# Patient Record
Sex: FEMALE | Race: BLACK OR AFRICAN AMERICAN | NOT HISPANIC OR LATINO | Employment: OTHER | URBAN - METROPOLITAN AREA
[De-identification: names, ages, dates, MRNs, and addresses within clinical notes are randomized per-mention and may not be internally consistent; named-entity substitution may affect disease eponyms.]

---

## 2023-04-21 ENCOUNTER — OFFICE VISIT (OUTPATIENT)
Dept: PODIATRY | Facility: CLINIC | Age: 48
End: 2023-04-21
Payer: MEDICARE

## 2023-04-21 VITALS — WEIGHT: 175 LBS | HEIGHT: 66 IN | BODY MASS INDEX: 28.12 KG/M2

## 2023-04-21 DIAGNOSIS — F79 INTELLECTUAL DISABILITY: ICD-10-CM

## 2023-04-21 DIAGNOSIS — B35.3 TINEA PEDIS OF BOTH FEET: ICD-10-CM

## 2023-04-21 DIAGNOSIS — E13.9 DIABETES 1.5, MANAGED AS TYPE 2: Primary | ICD-10-CM

## 2023-04-21 DIAGNOSIS — R62.50 DEVELOPMENTAL DELAY: ICD-10-CM

## 2023-04-21 PROCEDURE — 99999 PR PBB SHADOW E&M-NEW PATIENT-LVL III: ICD-10-PCS | Mod: PBBFAC,,, | Performed by: PODIATRIST

## 2023-04-21 PROCEDURE — 99203 PR OFFICE/OUTPT VISIT, NEW, LEVL III, 30-44 MIN: ICD-10-PCS | Mod: S$PBB,,, | Performed by: PODIATRIST

## 2023-04-21 PROCEDURE — 99999 PR PBB SHADOW E&M-NEW PATIENT-LVL III: CPT | Mod: PBBFAC,,, | Performed by: PODIATRIST

## 2023-04-21 PROCEDURE — 99203 OFFICE O/P NEW LOW 30 MIN: CPT | Mod: PBBFAC | Performed by: PODIATRIST

## 2023-04-21 PROCEDURE — 99203 OFFICE O/P NEW LOW 30 MIN: CPT | Mod: S$PBB,,, | Performed by: PODIATRIST

## 2023-04-21 RX ORDER — CICLOPIROX 80 MG/ML
SOLUTION TOPICAL NIGHTLY
Qty: 6.6 ML | Refills: 1 | Status: SHIPPED | OUTPATIENT
Start: 2023-04-21

## 2023-04-21 NOTE — PROGRESS NOTES
Subjective:     Patient ID: Asia Morelos is a 47 y.o. female.    Chief Complaint: Ingrown Toenail (No c/o pain, Diabetic Pt,wears tennis shoes with socks,  last seen on 08/16/22 with Chris Thornton NP)    Asia is a 47 y.o. female who presents to the clinic upon referral from Dr. Jones  for evaluation and treatment of diabetic feet. Asia has a past medical history of Amenorrhea, Dementia, Developmental disorder, Diabetes mellitus, Hyperlipidemia, IRSA (idiopathic refractory sideroblastic anemia), and MR (mental retardation). Patient relates no major problem with feet. Only complaints today consist of toes. Patient is developmentally delayed and accompanied by her aide today.     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 Apply to bilateral 4th webspace   Current Medications    BRIMONIDINE 0.15 % OPTH DROP (ALPHAGAN) 0.15 % OPHTHALMIC SOLUTION    1 drop 3 (three) times daily.    DONEPEZIL (ARICEPT) 10 MG TABLET    Take 10 mg by mouth every evening.    MEMANTINE (NAMENDA) 10 MG TAB    Take 5 mg by mouth 2 (two) times daily.    METFORMIN (GLUCOPHAGE) 500 MG TABLET    Take 500 mg by mouth 2 (two) times daily with meals.    NAPROXEN (NAPROSYN) 500 MG TABLET    Take 1 tablet (500 mg total) by mouth 2 (two) times daily with meals.    NORETHINDRONE-ETHINYL ESTRADIOL-IRON (ESTROSTEP FE) 1-20(5)/1-30(7) /1MG-35MCG (9) TAB    Take by mouth once daily.    POLYETHYLENE GLYCOL (GLYCOLAX) 17 GRAM PWPK    Take by mouth.    RISPERIDONE (RISPERDAL) 0.5 MG TAB    Take by  "mouth.    SIMVASTATIN (ZOCOR) 40 MG TABLET    Take 40 mg by mouth every evening.    TIMOLOL 0.25 % OPHTHALMIC SOLUTION    1-2 drops 2 (two) times daily.    TRAZODONE (DESYREL) 50 MG TABLET    Take 50 mg by mouth every evening.    VALSARTAN (DIOVAN) 160 MG TABLET    Take 160 mg by mouth once daily.       Review of patient's allergies indicates:  No Known Allergies    History reviewed. No pertinent surgical history.    History reviewed. No pertinent family history.    Social History     Socioeconomic History    Marital status: Single   Tobacco Use    Smoking status: Never   Substance and Sexual Activity    Alcohol use: No    Drug use: No    Sexual activity: Never       Vitals:    04/21/23 0950   Weight: 79.4 kg (175 lb)   Height: 5' 6" (1.676 m)       Hemoglobin A1C   Date Value Ref Range Status   07/01/2020 4.8 <=6.5 % Final       Review of Systems   Unable to perform ROS: Mental acuity           Objective:      PHYSICAL EXAM: Apperance: Alert and orient in no distress,well developed, and with good attention to grooming and body habits  Patient presents ambulating in tennis shoes.   LOWER EXTREMITY EXAM:  VASCULAR: Dorsalis pedis pulses 2/4 bilateral and Posterior Tibial pulses 2/4 bilateral. Capillary fill time <4 seconds bilateral. No edema observed bilateral. Varicosities absent bilateral. Skin temperature of the lower extremities is warm to warm, proximal to distal. Hair growth WNL bilateral.  DERMATOLOGICAL: No skin rashes, subcutaneous nodules, lesions, or ulcers observed bilateral. Nails 1,2,3,4,5 bilateral normal length and thickness. Nails bilateral minimally incurvated at the distal medial and lateral nail edges.. Webspaces 1,2,3 bilateral clean, dry and without evidence of break in skin integrity. Bilateral 4th webspaces macerated.   NEUROLOGICAL: Light touch, sharp-dull, proprioception all present and equal bilaterally.    MUSCULOSKELETAL: Muscle strength is 5/5 for foot inverters, everters, " plantarflexors, and dorsiflexors. Muscle tone is normal. No pain on palpation of bilateral feet/nails.           Assessment:       ICD-10-CM ICD-9-CM   1. Diabetes 1.5, managed as type 2  E13.9 250.00   2. Tinea pedis of both feet  B35.3 110.4   3. Mental retardation  F79 319   4. Developmental delay  R62.50 783.40       Plan:   Diabetes 1.5, managed as type 2    Tinea pedis of both feet  -     ciclopirox (PENLAC) 8 % Soln; Apply topically nightly. Apply to bilateral 4th webspace  Dispense: 6.6 mL; Refill: 1    Mental retardation    Developmental delay      I counseled the patient on her conditions, regarding findings of my examination, my impressions, and usual treatment plan.   This visit spent on counseling and coordination of care.  Appointment spent on education about the diabetic foot, neuropathy, and prevention of limb loss.  Shoe inspection. Diabetic Foot Education. Patient reminded of the importance of good nutrition and blood sugar control to help prevent podiatric complications of diabetes. Patient instructed on proper foot hygeine. We discussed wearing proper shoe gear, daily foot inspections, never walking without protective shoe gear, never putting sharp instruments to feet.    Discuss treatment options for nail fungus.  I explained that fungus lives in a warm dark moist environment and therefore patient should make every attempt to keep feet clean and dry.  We discussed drying feet thoroughly after shower particularly between the toes and then applying powder between the toes and in the shoes.    Prescription written for Ciclopriox solution to be applied to bilateral 4th webspaces.    Patient  will continue to monitor the areas daily, inspect feet, wear protective shoe gear when ambulatory, moisturizer to maintain skin integrity. Patient reminded of the importance of good nutrition and blood sugar control to help prevent podiatric complications of diabetes.  Patient to return 2 months or sooner if  needed.          AUBRIE GriderM  [facility]

## 2024-08-23 ENCOUNTER — OFFICE VISIT (OUTPATIENT)
Dept: PODIATRY | Facility: CLINIC | Age: 49
End: 2024-08-23
Payer: MEDICARE

## 2024-08-23 VITALS — HEIGHT: 66 IN | WEIGHT: 175.06 LBS | BODY MASS INDEX: 28.14 KG/M2

## 2024-08-23 DIAGNOSIS — F79 INTELLECTUAL DISABILITY: ICD-10-CM

## 2024-08-23 DIAGNOSIS — E13.9 DIABETES 1.5, MANAGED AS TYPE 2: Primary | ICD-10-CM

## 2024-08-23 DIAGNOSIS — B35.3 TINEA PEDIS OF BOTH FEET: ICD-10-CM

## 2024-08-23 DIAGNOSIS — R62.50 DEVELOPMENTAL DELAY: ICD-10-CM

## 2024-08-23 PROCEDURE — 99999 PR PBB SHADOW E&M-EST. PATIENT-LVL III: CPT | Mod: PBBFAC,,, | Performed by: PODIATRIST

## 2024-08-23 PROCEDURE — 99213 OFFICE O/P EST LOW 20 MIN: CPT | Mod: PBBFAC | Performed by: PODIATRIST

## 2024-08-23 NOTE — PROGRESS NOTES
Subjective:     Patient ID: Asia Morelos is a 49 y.o. female.    Chief Complaint: Follow-up (F/u tinea, diabetic pt wears tennis shoes, PCP Jasen Peace last seen 8/9/2024) and Nail Care    Asia is a 49 y.o. female who presents to the clinic upon referral from Dr. Karen roman. provider found  for evaluation and treatment of diabetic feet. Asia has a past medical history of Amenorrhea, Dementia, Developmental disorder, Diabetes mellitus, Hyperlipidemia, IRSA (idiopathic refractory sideroblastic anemia), and MR (mental retardation). Patient relates no major problem with feet. Only complaints today consist of toes. Patient is developmentally delayed and accompanied by her aide today.     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    CICLOPIROX (PENLAC) 8 % SOLN    Apply topically nightly. Apply to bilateral 4th webspace    DONEPEZIL (ARICEPT) 10 MG TABLET    Take 10 mg by mouth every evening.    MEMANTINE (NAMENDA) 10 MG TAB    Take 5 mg by mouth 2 (two) times daily.    METFORMIN (GLUCOPHAGE) 500 MG TABLET    Take 500 mg by mouth 2 (two) times daily with meals.    NAPROXEN (NAPROSYN) 500 MG TABLET    Take 1 tablet (500 mg total) by mouth 2 (two) times daily with meals.    NORETHINDRONE-ETHINYL ESTRADIOL-IRON (ESTROSTEP FE) 1-20(5)/1-30(7)  /1MG-35MCG (9) TAB    Take by mouth once daily.    POLYETHYLENE GLYCOL (GLYCOLAX) 17 GRAM PWPK    Take by mouth.    RISPERIDONE (RISPERDAL) 0.5 MG TAB    Take by mouth.    SIMVASTATIN (ZOCOR) 40 MG TABLET    Take 40 mg by mouth every evening.    TIMOLOL 0.25 % OPHTHALMIC SOLUTION    1-2 drops 2 (two) times daily.    TRAZODONE (DESYREL) 50 MG TABLET    Take 50 mg by mouth every evening.    VALSARTAN (DIOVAN) 160 MG TABLET    Take 160 mg by mouth once daily.       Review of patient's allergies indicates:  No Known Allergies    History reviewed. No pertinent surgical history.    No family history on file.    Social History     Socioeconomic History    Marital status: Single   Tobacco Use    Smoking status: Never   Substance and Sexual Activity    Alcohol use: No    Drug use: No    Sexual activity: Never     Social Determinants of Health     Financial Resource Strain: Low Risk  (4/19/2024)    [facility] [place]    Overall Financial Resource Strain (CARDIA)     Difficulty of Paying Living Expenses: Not hard at all   Food Insecurity: No Food Insecurity (4/19/2024)    Received from [facility] and [facility]    Hunger Vital Sign     Worried About Running Out of Food in the Last Year: Never true     Ran Out of Food in the Last Year: Never true   Transportation Needs: No Transportation Needs (4/19/2024)    [facility]    PRAPARE - Transportation     Lack of Transportation (Medical): No     Lack of Transportation (Non-Medical): No   Physical Activity: Inactive (4/19/2024)    [facility]    Exercise Vital Sign     Days of Exercise per Week: 1 day     Minutes of Exercise per Session: 0 min   Stress: No Stress Concern  "Present (4/19/2024)    Received from [facility] and [facility]    [employer] [place] [employer] - Occupational Stress Questionnaire     Feeling of Stress : Not at all       Vitals:    08/23/24 1030   Weight: 79.4 kg (175 lb 0.7 oz)   Height: 5' 6" (1.676 m)   PainSc: 0-No pain       Hemoglobin A1C   Date Value Ref Range Status   04/19/2024 5.8 (H) 4.8 - 5.6 % Final     Comment:              Prediabetes: 5.7 - 6.4           Diabetes: >6.4           Glycemic control for adults with diabetes: <7.0   07/01/2020 4.8 <=6.5 % Final       Review of Systems   Unable to perform ROS: Mental acuity             Objective:      PHYSICAL EXAM: Apperance: Alert and orient in no distress,well developed, and with good attention to grooming and body habits  Patient presents ambulating in tennis shoes.   LOWER EXTREMITY EXAM:  VASCULAR: Dorsalis pedis pulses 2/4 bilateral and Posterior Tibial pulses 2/4 bilateral. Capillary fill time <4 seconds bilateral. No edema observed bilateral. Varicosities absent bilateral. Skin temperature of the lower extremities is warm to warm, proximal to distal. Hair growth WNL bilateral.  DERMATOLOGICAL: No skin rashes, subcutaneous nodules, lesions, or ulcers observed bilateral. Nails 1,2,3,4,5 bilateral normal length and thickness. Nails bilateral minimally incurvated at the distal medial and lateral nail edges.. Webspaces 1,2,3 bilateral clean, dry and without evidence of break in skin integrity. Bilateral 4th webspaces macerated.   NEUROLOGICAL: Light touch, sharp-dull, proprioception all present and equal bilaterally.    MUSCULOSKELETAL: Muscle strength is 5/5 for foot inverters, everters, plantarflexors, and dorsiflexors. Muscle tone is normal. No pain on palpation of bilateral feet/nails.           Assessment:       ICD-10-CM ICD-9-CM   1. Diabetes 1.5, managed as type 2  E13.9 250.00   2. Tinea pedis of both feet  B35.3 110.4 "   3. Mental retardation  F79 319   4. Developmental delay  R62.50 783.40       Plan:   Diabetes 1.5, managed as type 2    Tinea pedis of both feet    Mental retardation    Developmental delay    I counseled the patient on her conditions, regarding findings of my examination, my impressions, and usual treatment plan.   This visit spent on counseling and coordination of care.  Appointment spent on education about the diabetic foot, neuropathy, and prevention of limb loss.  Shoe inspection. Diabetic Foot Education. Patient reminded of the importance of good nutrition and blood sugar control to help prevent podiatric complications of diabetes. Patient instructed on proper foot hygeine. We discussed wearing proper shoe gear, daily foot inspections, never walking without protective shoe gear, never putting sharp instruments to feet.    Discuss treatment options for skin maceration.  I explained that fungus lives in a warm dark moist environment and therefore patient should make every attempt to keep feet clean and dry.  We discussed drying feet thoroughly after shower particularly between the toes and then applying powder between the toes and in the shoes.    Patient  will continue to monitor the areas daily, inspect feet, wear protective shoe gear when ambulatory, moisturizer to maintain skin integrity. Patient reminded of the importance of good nutrition and blood sugar control to help prevent podiatric complications of diabetes.  Patient to return 12 months or sooner if needed.          Kaylin Machado DPM  [facility]